# Patient Record
Sex: FEMALE | Race: WHITE | Employment: STUDENT | URBAN - METROPOLITAN AREA
[De-identification: names, ages, dates, MRNs, and addresses within clinical notes are randomized per-mention and may not be internally consistent; named-entity substitution may affect disease eponyms.]

---
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
## 2019-01-16 ENCOUNTER — OFFICE VISIT (OUTPATIENT)
Dept: PHYSICAL THERAPY | Age: 19
End: 2019-01-16
Attending: PEDIATRICS
Payer: COMMERCIAL

## 2019-01-16 PROCEDURE — 97161 PT EVAL LOW COMPLEX 20 MIN: CPT

## 2019-01-16 PROCEDURE — 97110 THERAPEUTIC EXERCISES: CPT

## 2019-01-16 NOTE — PROGRESS NOTES
SPINE EVALUATION:   Referring Physician: Remy An MD  Referring Diagnosis: Strain of neck muscle     Date of Service: 1/16/2019     PATIENT Foster Brittle Petraitis is a 22year old y/o female who presents to therapy today with compla rhinitis due to pollen    • Asthma    • Mild Tourette's syndrome     diagnosed in 2nd grade     Past Surgical History:   Procedure Laterality Date   • Other surgical history  1/2015, 4/2015    bilateral bunionectomy      ASSESSMENT  Pt presents to clinic w deep neck flexors and median nerve glides.   Charges: PT Eval Low Complexity, TherEx x1 (15 min)      Total Timed Treatment: 15 min     Total Treatment Time: 45 min     PLAN OF CARE:    Goals:    · Pt will demonstrate negative neural tension tests (6 visits

## 2019-01-23 ENCOUNTER — OFFICE VISIT (OUTPATIENT)
Dept: PHYSICAL THERAPY | Age: 19
End: 2019-01-23
Payer: COMMERCIAL

## 2019-01-23 PROCEDURE — 97140 MANUAL THERAPY 1/> REGIONS: CPT

## 2019-01-23 PROCEDURE — 97110 THERAPEUTIC EXERCISES: CPT

## 2019-01-23 NOTE — PROGRESS NOTES
Dx: Strain of neck muscle   Authorized # of Visits: 8 per [facility] [place] Next MD Visit: as needed   Fall Risk: Standard  Precautions: None      Subjective: \"Neck is a little stiff today, I think I slept funny last night. \" She states she just finished a workout (upp

## 2019-01-28 ENCOUNTER — OFFICE VISIT (OUTPATIENT)
Dept: PHYSICAL THERAPY | Age: 19
End: 2019-01-28
Payer: COMMERCIAL

## 2019-01-28 PROCEDURE — 97112 NEUROMUSCULAR REEDUCATION: CPT

## 2019-01-28 PROCEDURE — 97110 THERAPEUTIC EXERCISES: CPT

## 2019-01-29 NOTE — PROGRESS NOTES
Dx: Strain of neck muscle   Authorized # of Visits: 8 per POC Next MD Visit: as needed   Fall Risk: Standard  Precautions: None      Subjective: She states her neck is feeling better. Exercises at home getting easier.      Objective:   Deep Neck Flexor Endu

## 2019-01-30 ENCOUNTER — OFFICE VISIT (OUTPATIENT)
Dept: PHYSICAL THERAPY | Age: 19
End: 2019-01-30
Payer: COMMERCIAL

## 2019-02-04 ENCOUNTER — APPOINTMENT (OUTPATIENT)
Dept: PHYSICAL THERAPY | Age: 19
End: 2019-02-04
Payer: COMMERCIAL

## 2020-11-23 ENCOUNTER — ORDER TRANSCRIPTION (OUTPATIENT)
Dept: PHYSICAL THERAPY | Age: 20
End: 2020-11-23

## 2020-11-23 DIAGNOSIS — M54.2 CERVICALGIA: Primary | ICD-10-CM

## 2023-09-20 ENCOUNTER — ORDER TRANSCRIPTION (OUTPATIENT)
Dept: PHYSICAL THERAPY | Facility: HOSPITAL | Age: 23
End: 2023-09-20

## 2023-09-20 DIAGNOSIS — S93.409A ANKLE SPRAIN: Primary | ICD-10-CM

## 2023-10-05 ENCOUNTER — TELEPHONE (OUTPATIENT)
Dept: PHYSICAL THERAPY | Facility: HOSPITAL | Age: 23
End: 2023-10-05

## 2023-10-06 ENCOUNTER — APPOINTMENT (OUTPATIENT)
Dept: PHYSICAL THERAPY | Age: 23
End: 2023-10-06
Attending: PODIATRIST
Payer: COMMERCIAL

## 2023-10-10 ENCOUNTER — TELEPHONE (OUTPATIENT)
Dept: PHYSICAL THERAPY | Age: 23
End: 2023-10-10

## 2023-10-10 ENCOUNTER — APPOINTMENT (OUTPATIENT)
Dept: PHYSICAL THERAPY | Age: 23
End: 2023-10-10
Attending: PODIATRIST
Payer: COMMERCIAL

## 2023-10-17 ENCOUNTER — OFFICE VISIT (OUTPATIENT)
Dept: PHYSICAL THERAPY | Age: 23
End: 2023-10-17
Attending: PODIATRIST
Payer: COMMERCIAL

## 2023-10-17 DIAGNOSIS — S93.401D SPRAIN OF RIGHT ANKLE, UNSPECIFIED LIGAMENT, SUBSEQUENT ENCOUNTER: ICD-10-CM

## 2023-10-17 DIAGNOSIS — S93.409A ANKLE SPRAIN: Primary | ICD-10-CM

## 2023-10-17 PROCEDURE — 97161 PT EVAL LOW COMPLEX 20 MIN: CPT

## 2023-10-17 PROCEDURE — 97110 THERAPEUTIC EXERCISES: CPT

## 2023-10-24 ENCOUNTER — OFFICE VISIT (OUTPATIENT)
Dept: PHYSICAL THERAPY | Age: 23
End: 2023-10-24
Attending: PODIATRIST
Payer: COMMERCIAL

## 2023-10-24 PROCEDURE — 97112 NEUROMUSCULAR REEDUCATION: CPT

## 2023-10-24 PROCEDURE — 97110 THERAPEUTIC EXERCISES: CPT

## 2023-10-24 NOTE — PROGRESS NOTES
Referring Diagnosis: Ankle sprain (T77.944S)    Referring Provider: Aden Laboy Date of Evaluation: 10/17/2023   Precautions: Rodrigo Bedoya MD visit: none scheduled   Date of Surgery: n/a    Insurance Primary/Secondary: [employer] [place] / N/A     # Auth Visits: 8 POC     Subjective: Doing well since last session, HEP is going well. Current Pain: 0/10    Objective:   CKC Ankle DF ROM: R 12.0 cm, L 12.5 cm     Assessment: Improved CKC ankle DF mobility. Progressed exercises to challenge ankle stability on unstable surface, increased challenge without pain or ankle giving way. Goals:   Pt will improve R ankle DF AROM to >/= 15 deg (5 visits)  Pt will improve R ankle CKC DF to >/=12.5 cm (5 visits)  Pt will be able to pivot on right LE without loss of control into inversion (8 visits)  Pt will be able to perform hip thrust exercise without feeling of instability in right ankle  (8 visits)    Plan: Continue PT. Date:   10/24/2023  TX#: 2 Date:  TX#: 3 Date:  TX#: 4 Date:  TX#: 5 Date:  TX#: 6 Date:  TX#: 7 Date:  TX#: 8   TherEx:  -CKC ankle DF stretch R/L 3x30 sec  -SLS with 7.5# cable medial pull at knee R/L 4x30 sec  -Partial lunge with 7.5# medial cable pull at knee R/L 2x10 reps         NMR:  -SLS on airex, R/L 3x30 sec  -Flat BOSU squats 2x15 reps  -Bridge with feet on round BOSU 2x15 reps           HEP: bridge with feet on round BOSU, SLS with medial band pull at knee, flat BOSU squat    Charges:  TherEx x2 (30 min), NMR x1 (10 min)       Total Timed Treatment: 40 min  Total Treatment Time: 40 min

## 2023-10-31 ENCOUNTER — APPOINTMENT (OUTPATIENT)
Dept: PHYSICAL THERAPY | Age: 23
End: 2023-10-31
Attending: PODIATRIST
Payer: COMMERCIAL

## 2023-11-07 ENCOUNTER — APPOINTMENT (OUTPATIENT)
Dept: PHYSICAL THERAPY | Age: 23
End: 2023-11-07
Attending: PODIATRIST
Payer: COMMERCIAL

## 2023-11-14 ENCOUNTER — APPOINTMENT (OUTPATIENT)
Dept: PHYSICAL THERAPY | Age: 23
End: 2023-11-14
Attending: PODIATRIST
Payer: COMMERCIAL

## 2023-11-16 ENCOUNTER — TELEPHONE (OUTPATIENT)
Dept: PHYSICAL THERAPY | Age: 23
End: 2023-11-16

## 2023-11-16 NOTE — TELEPHONE ENCOUNTER
LVM regarding multiple missed appointments. Requested call back to confirm next appointment, otherwise will discharge from this episode of care.

## 2023-11-21 ENCOUNTER — APPOINTMENT (OUTPATIENT)
Dept: PHYSICAL THERAPY | Age: 23
End: 2023-11-21
Attending: PODIATRIST
Payer: COMMERCIAL

## 2023-11-28 ENCOUNTER — APPOINTMENT (OUTPATIENT)
Dept: PHYSICAL THERAPY | Age: 23
End: 2023-11-28
Attending: PODIATRIST
Payer: COMMERCIAL

## 2024-05-04 ENCOUNTER — APPOINTMENT (OUTPATIENT)
Dept: CT IMAGING | Age: 24
End: 2024-05-04
Attending: EMERGENCY MEDICINE

## 2024-05-04 ENCOUNTER — HOSPITAL ENCOUNTER (EMERGENCY)
Age: 24
Discharge: HOME | End: 2024-05-04
Attending: EMERGENCY MEDICINE

## 2024-05-04 ENCOUNTER — APPOINTMENT (OUTPATIENT)
Dept: GENERAL RADIOLOGY | Age: 24
End: 2024-05-04
Attending: EMERGENCY MEDICINE

## 2024-05-04 VITALS
TEMPERATURE: 98 F | DIASTOLIC BLOOD PRESSURE: 90 MMHG | RESPIRATION RATE: 15 BRPM | HEART RATE: 86 BPM | SYSTOLIC BLOOD PRESSURE: 121 MMHG | OXYGEN SATURATION: 100 %

## 2024-05-04 DIAGNOSIS — R07.9 CHEST PAIN, UNSPECIFIED TYPE: Primary | ICD-10-CM

## 2024-05-04 LAB
ALBUMIN SERPL-MCNC: 4.3 G/DL (ref 3.6–5.1)
ALBUMIN/GLOB SERPL: 1.3 {RATIO} (ref 1–2.4)
ALP SERPL-CCNC: 49 UNITS/L (ref 45–117)
ALT SERPL-CCNC: 18 UNITS/L
ANION GAP SERPL CALC-SCNC: 11 MMOL/L (ref 7–19)
AST SERPL-CCNC: 25 UNITS/L
BASOPHILS # BLD: 0.1 K/MCL (ref 0–0.3)
BASOPHILS NFR BLD: 1 %
BILIRUB SERPL-MCNC: 0.4 MG/DL (ref 0.2–1)
BUN SERPL-MCNC: 12 MG/DL (ref 6–20)
BUN/CREAT SERPL: 15 (ref 7–25)
CALCIUM SERPL-MCNC: 8.8 MG/DL (ref 8.4–10.2)
CHLORIDE SERPL-SCNC: 108 MMOL/L (ref 97–110)
CO2 SERPL-SCNC: 24 MMOL/L (ref 21–32)
CREAT SERPL-MCNC: 0.78 MG/DL (ref 0.51–0.95)
DEPRECATED RDW RBC: 37.7 FL (ref 39–50)
EGFRCR SERPLBLD CKD-EPI 2021: >90 ML/MIN/{1.73_M2}
EOSINOPHIL # BLD: 0.2 K/MCL (ref 0–0.5)
EOSINOPHIL NFR BLD: 4 %
ERYTHROCYTE [DISTWIDTH] IN BLOOD: 11.8 % (ref 11–15)
FASTING DURATION TIME PATIENT: NORMAL H
GLOBULIN SER-MCNC: 3.4 G/DL (ref 2–4)
GLUCOSE SERPL-MCNC: 88 MG/DL (ref 70–99)
HCG SERPL QL: NEGATIVE
HCT VFR BLD CALC: 44.7 % (ref 36–46.5)
HGB BLD-MCNC: 15 G/DL (ref 12–15.5)
IMM GRANULOCYTES # BLD AUTO: 0 K/MCL (ref 0–0.2)
IMM GRANULOCYTES # BLD: 0 %
LIPASE SERPL-CCNC: 50 UNITS/L (ref 15–77)
LYMPHOCYTES # BLD: 2.3 K/MCL (ref 1–4.8)
LYMPHOCYTES NFR BLD: 34 %
MCH RBC QN AUTO: 29.6 PG (ref 26–34)
MCHC RBC AUTO-ENTMCNC: 33.6 G/DL (ref 32–36.5)
MCV RBC AUTO: 88.3 FL (ref 78–100)
MONOCYTES # BLD: 0.7 K/MCL (ref 0.3–0.9)
MONOCYTES NFR BLD: 11 %
NEUTROPHILS # BLD: 3.4 K/MCL (ref 1.8–7.7)
NEUTROPHILS NFR BLD: 50 %
NRBC BLD MANUAL-RTO: 0 /100 WBC
PLATELET # BLD AUTO: 243 K/MCL (ref 140–450)
POTASSIUM SERPL-SCNC: 3.4 MMOL/L (ref 3.4–5.1)
PROT SERPL-MCNC: 7.7 G/DL (ref 6.4–8.2)
RBC # BLD: 5.06 MIL/MCL (ref 4–5.2)
SODIUM SERPL-SCNC: 140 MMOL/L (ref 135–145)
TROPONIN I SERPL DL<=0.01 NG/ML-MCNC: 19 NG/L
TROPONIN I SERPL DL<=0.01 NG/ML-MCNC: 19 NG/L
WBC # BLD: 6.6 K/MCL (ref 4.2–11)

## 2024-05-04 PROCEDURE — 85025 COMPLETE CBC W/AUTO DIFF WBC: CPT | Performed by: EMERGENCY MEDICINE

## 2024-05-04 PROCEDURE — 84484 ASSAY OF TROPONIN QUANT: CPT | Performed by: EMERGENCY MEDICINE

## 2024-05-04 PROCEDURE — 10002803 HB RX 637: Performed by: EMERGENCY MEDICINE

## 2024-05-04 PROCEDURE — 93005 ELECTROCARDIOGRAM TRACING: CPT | Performed by: EMERGENCY MEDICINE

## 2024-05-04 PROCEDURE — 71275 CT ANGIOGRAPHY CHEST: CPT

## 2024-05-04 PROCEDURE — 10002805 HB CONTRAST AGENT: Performed by: EMERGENCY MEDICINE

## 2024-05-04 PROCEDURE — 71046 X-RAY EXAM CHEST 2 VIEWS: CPT

## 2024-05-04 PROCEDURE — 36415 COLL VENOUS BLD VENIPUNCTURE: CPT

## 2024-05-04 PROCEDURE — 84703 CHORIONIC GONADOTROPIN ASSAY: CPT | Performed by: EMERGENCY MEDICINE

## 2024-05-04 PROCEDURE — 80053 COMPREHEN METABOLIC PANEL: CPT | Performed by: EMERGENCY MEDICINE

## 2024-05-04 PROCEDURE — 99285 EMERGENCY DEPT VISIT HI MDM: CPT

## 2024-05-04 PROCEDURE — 83690 ASSAY OF LIPASE: CPT | Performed by: EMERGENCY MEDICINE

## 2024-05-04 PROCEDURE — 93010 ELECTROCARDIOGRAM REPORT: CPT | Performed by: INTERNAL MEDICINE

## 2024-05-04 RX ORDER — IBUPROFEN 600 MG/1
600 TABLET ORAL ONCE
Status: COMPLETED | OUTPATIENT
Start: 2024-05-04 | End: 2024-05-04

## 2024-05-04 RX ADMIN — IBUPROFEN 600 MG: 600 TABLET, FILM COATED ORAL at 19:30

## 2024-05-04 RX ADMIN — IOHEXOL 80 ML: 350 INJECTION, SOLUTION INTRAVENOUS at 20:52

## 2024-05-04 ASSESSMENT — HEART SCORE
HEART SCORE: 1
TROPONIN: EQUAL OR LESS THAN NORMAL LIMIT
AGE: LESS THAN OR EQUAL TO 45
EKG: NORMAL
HISTORY: SLIGHTLY SUSPICIOUS
RISK FACTORS: 1-2 RISK FACTORS

## 2024-05-05 LAB
ATRIAL RATE (BPM): 85
P AXIS (DEGREES): 83
PR-INTERVAL (MSEC): 140
QRS-INTERVAL (MSEC): 76
QT-INTERVAL (MSEC): 362
QTC: 430
R AXIS (DEGREES): 74
REPORT TEXT: NORMAL
T AXIS (DEGREES): 65
VENTRICULAR RATE EKG/MIN (BPM): 85

## (undated) NOTE — LETTER
Patient Name: Jessy Mccoy  YOB: 2000          MRN number:  [id number]  Date:  1/17/2019  Referring Physician: Camilo Fonseca MD     SPINE EVALUATION: Referring Physician: Camilo Fonseca MD     Referring Diagnosis: Strain prior to onset. Pt goals include resolve symptoms and return to normal exercise routine. Past medical history was reviewed with Osmin Prasad.  Significant findings include:   Past Medical History:   Diagnosis Date   • Allergic rhinitis due to pollen    • Asth Patient education provided on relevant anatomy, intervention rationale, expected course of interventions, deep neck flexor endurance importance, neural tension/mobility concepts.  Patient was instructed in and issued a HEP for deep neck flexors and median n